# Patient Record
(demographics unavailable — no encounter records)

---

## 2024-10-30 NOTE — PROCEDURE
[IUD Placement] : intrauterine device (IUD) placement [Time out performed] : Pre-procedure time out performed.  Patient's name, date of birth and procedure confirmed. [Consent Obtained] : Consent obtained [Prevention of Pregnancy] : prevention of pregnancy [Risks] : risks [Benefits] : benefits [Alternatives] : alternatives [Patient] : patient [Infection] : infection [Bleeding] : bleeding [Pain] : pain [Expulsion] : expulsion [Failure] : failure [Uterine Perforation] : uterine perforation [Neg Pregnancy Test] : negative pregnancy test [Betadine] : Betadine [Tenaculum] : Tenaculum [Tolerated Well] : Patient tolerated the procedure well [No Complications] : No complications [Easy Passage] : Easy passage [Sounded to ___ cm] : sounded to [unfilled] ~Ucm [Mirena IUD] : Mirena IUD [Motrin/Ibuprofen] : Motrin/Ibuprofen [de-identified] : Structures given nothing per vagina

## 2024-11-27 NOTE — PHYSICAL EXAM
[Chaperone Present] : A chaperone was present in the examining room during all aspects of the physical examination [FreeTextEntry2] : EMILY

## 2024-11-27 NOTE — HISTORY OF PRESENT ILLNESS
[TextBox_4] : PT HERE FOR IUD CHECK  LMP: 11/19/24 [LMPDate] : 11/19/24 [TextBox_6] : 11/19/24 [FreeTextEntry1] : 11/19/24